# Patient Record
Sex: MALE | URBAN - METROPOLITAN AREA
[De-identification: names, ages, dates, MRNs, and addresses within clinical notes are randomized per-mention and may not be internally consistent; named-entity substitution may affect disease eponyms.]

---

## 2020-02-08 ENCOUNTER — NURSE TRIAGE (OUTPATIENT)
Dept: OTHER | Facility: OTHER | Age: 20
End: 2020-02-08

## 2020-02-08 NOTE — TELEPHONE ENCOUNTER
Regarding: possible strep throat   ----- Message from Romeo Ware sent at 2/8/2020 11:56 AM EST -----  " My son looks like he has strep throat "

## 2020-02-08 NOTE — TELEPHONE ENCOUNTER
Reason for Disposition   [1] Sore throat is the only symptom AND [2] present > 48 hours    Answer Assessment - Initial Assessment Questions  1  ONSET: "When did the throat start hurting?" (Hours or days ago)       2 days ago    2  SEVERITY: "How bad is the sore throat?" (Scale 1-10; mild, moderate or severe)    - MILD (1-3):  doesn't interfere with eating or normal activities    - MODERATE (4-7): interferes with eating some solids and normal activities    - SEVERE (8-10):  excruciating pain, interferes with most normal activities    - SEVERE DYSPHAGIA: can't swallow liquids, drooling      7/10     3  STREP EXPOSURE: "Has there been any exposure to strep within the past week?" If so, ask: "What type of contact occurred?"       Unsure  4  VIRAL SYMPTOMS: "Are there any symptoms of a cold, such as a runny nose, cough, hoarse voice or red eyes?"       Bodyaches    5  FEVER: "Do you have a fever?" If so, ask: "What is your temperature, how was it measured, and when did it start?"      Denies a fever at this time  6  PUS ON THE TONSILS: "Is there pus on the tonsils in the back of your throat?"      Patient states that his mother noticed that his tonsils are red, inflamed and noted white spots on the back of his throat      7  OTHER SYMPTOMS: "Do you have any other symptoms?" (e g , difficulty breathing, headache, rash)      Denies    Protocols used: SORE THROAT-ADULT-

## 2022-09-27 ENCOUNTER — HOSPITAL ENCOUNTER (EMERGENCY)
Facility: HOSPITAL | Age: 22
Discharge: HOME | End: 2022-09-27
Attending: EMERGENCY MEDICINE
Payer: COMMERCIAL

## 2022-09-27 ENCOUNTER — APPOINTMENT (OUTPATIENT)
Dept: RADIOLOGY | Facility: HOSPITAL | Age: 22
End: 2022-09-27
Payer: COMMERCIAL

## 2022-09-27 VITALS
DIASTOLIC BLOOD PRESSURE: 79 MMHG | RESPIRATION RATE: 18 BRPM | TEMPERATURE: 98.7 F | HEART RATE: 86 BPM | SYSTOLIC BLOOD PRESSURE: 142 MMHG | OXYGEN SATURATION: 99 % | WEIGHT: 279.1 LBS

## 2022-09-27 DIAGNOSIS — S93.402A LEFT ANKLE SPRAIN: Primary | ICD-10-CM

## 2022-09-27 PROCEDURE — 99283 EMERGENCY DEPT VISIT LOW MDM: CPT

## 2022-09-27 PROCEDURE — 99284 EMERGENCY DEPT VISIT MOD MDM: CPT | Performed by: PHYSICIAN ASSISTANT

## 2022-09-27 PROCEDURE — 73610 X-RAY EXAM OF ANKLE: CPT

## 2022-09-27 NOTE — Clinical Note
Christal Smart was seen and treated in our emergency department on 9/27/2022  Diagnosis:     Maximino Palm  may return to work on return date  He may return on this date: 10/03/2022         If you have any questions or concerns, please don't hesitate to call        Meera Altman PA-C    ______________________________           _______________          _______________  Hospital Representative                              Date                                Time

## 2022-09-27 NOTE — ED PROVIDER NOTES
History  Chief Complaint   Patient presents with    Ankle Injury     Pt stepped on a shoe and rolled left ankle yesterday  Reports pain and swelling  No meds pta     Patient is a 42-year-old male presenting to the emergency department for evaluation of left ankle injury  Patient states yesterday he stepped on a shoe when he rolled his left ankle  Patient with previous injuries to same ankle  Patient states he was able to ambulate on ankle initially, woke up this morning with worsening swelling and pain localized to the lateral malleolus  Patient with pain with walking  Patient has not taken any medications prior to arrival   Patient without numbness/tingling, knee pain, open wound  None       History reviewed  No pertinent past medical history  Past Surgical History:   Procedure Laterality Date    CIRCUMCISION         History reviewed  No pertinent family history  I have reviewed and agree with the history as documented  E-Cigarette/Vaping     E-Cigarette/Vaping Substances     Social History     Tobacco Use    Smoking status: Current Every Day Smoker     Packs/day: 0 25     Types: Cigarettes    Smokeless tobacco: Never Used   Substance Use Topics    Alcohol use: Yes    Drug use: Yes     Types: Marijuana       Review of Systems   Musculoskeletal: Positive for arthralgias  All other systems reviewed and are negative  Physical Exam  Physical Exam  Constitutional:       Appearance: Normal appearance  HENT:      Head: Normocephalic and atraumatic  Right Ear: External ear normal       Left Ear: External ear normal       Nose: Nose normal       Mouth/Throat:      Lips: Pink  Mouth: Mucous membranes are moist    Eyes:      Extraocular Movements: Extraocular movements intact  Conjunctiva/sclera: Conjunctivae normal    Pulmonary:      Effort: No tachypnea or respiratory distress  Musculoskeletal:      Cervical back: Normal range of motion and neck supple        Right ankle: Normal       Left ankle: Swelling present  No deformity, ecchymosis or lacerations  Tenderness present over the lateral malleolus  Decreased range of motion  Left Achilles Tendon: Normal       Comments: Neurovascularly intact distally  5/5 strength bilateral lower extremities  Distal pulses intact  Cap refill <2 seconds  Sensation intact  Skin:     General: Skin is warm  Capillary Refill: Capillary refill takes less than 2 seconds  Neurological:      Mental Status: He is alert and oriented to person, place, and time  GCS: GCS eye subscore is 4  GCS verbal subscore is 5  GCS motor subscore is 6  Psychiatric:         Mood and Affect: Mood and affect normal          Speech: Speech normal          Vital Signs  ED Triage Vitals   Temperature Pulse Respirations Blood Pressure SpO2   09/27/22 1142 09/27/22 1144 09/27/22 1144 09/27/22 1144 09/27/22 1144   98 7 °F (37 1 °C) 86 18 142/79 99 %      Temp Source Heart Rate Source Patient Position - Orthostatic VS BP Location FiO2 (%)   09/27/22 1142 09/27/22 1144 09/27/22 1144 09/27/22 1144 --   Oral Monitor Sitting Right arm       Pain Score       09/27/22 1144       9           Vitals:    09/27/22 1144   BP: 142/79   Pulse: 86   Patient Position - Orthostatic VS: Sitting         Visual Acuity      ED Medications  Medications - No data to display    Diagnostic Studies  Results Reviewed     None                 XR ankle 3+ views LEFT    (Results Pending)              Procedures  Procedures         ED Course                               SBIRT 22yo+    Flowsheet Row Most Recent Value   SBIRT (23 yo +)    In order to provide better care to our patients, we are screening all of our patients for alcohol and drug use  Would it be okay to ask you these screening questions?  No Filed at: 09/27/2022 1252                    [facility]  Number of Diagnoses or Management Options  Left ankle sprain  Diagnosis management comments: Patient is a 49-year-old male presenting to the emergency department for evaluation of left ankle injury  X-ray left ankle shows no acute osseous abnormality seen by me  Will place patient in air cast, provided crutches  RICE  F/u with ortho    Patient verbalizes understanding and agrees with plan  The management plan was discussed in detail with the patient at bedside and all questions were answered  Prior to discharge, I provided both verbal and written instructions  I discussed with the patient the signs and symptoms for which to return to the emergency department  All questions were answered and patient was comfortable with the plan of care and discharged to home  The patient agrees to return to the Emergency Department for concerns and/or progression of illness  Disposition  Final diagnoses:   Left ankle sprain     Time reflects when diagnosis was documented in both MDM as applicable and the Disposition within this note     Time User Action Codes Description Comment    9/27/2022 12:40 PM Reji Alis Add [[id number]] Left ankle sprain       ED Disposition     ED Disposition   Discharge    Condition   Stable    Date/Time   Tue Sep 27, 2022 12:40 PM    Comment   Kuldeep Neil discharge to home/self care  Follow-up Information     Follow up With Specialties Details Why Contact Info Additional [address] [facility] Schedule an appointment as soon as possible for a visit  If symptoms worsen [address]  [address] [facility] [address]  [address] [place] [address], [address] [facility] [place], [place], [address] [phone]          Patient's Medications    No medications on file       No discharge procedures on file      PDMP Review     None          ED Provider  Electronically Signed by           Amber Bradford PA-C  09/27/22 6822